# Patient Record
Sex: MALE | Race: AMERICAN INDIAN OR ALASKA NATIVE
[De-identification: names, ages, dates, MRNs, and addresses within clinical notes are randomized per-mention and may not be internally consistent; named-entity substitution may affect disease eponyms.]

---

## 2018-12-11 ENCOUNTER — HOSPITAL ENCOUNTER (EMERGENCY)
Dept: HOSPITAL 42 - ED | Age: 58
Discharge: HOME | End: 2018-12-11
Payer: COMMERCIAL

## 2018-12-11 VITALS
SYSTOLIC BLOOD PRESSURE: 129 MMHG | TEMPERATURE: 98.2 F | DIASTOLIC BLOOD PRESSURE: 88 MMHG | RESPIRATION RATE: 17 BRPM | HEART RATE: 72 BPM

## 2018-12-11 VITALS — OXYGEN SATURATION: 97 %

## 2018-12-11 VITALS — BODY MASS INDEX: 30.8 KG/M2

## 2018-12-11 DIAGNOSIS — L02.415: Primary | ICD-10-CM

## 2018-12-11 NOTE — ED PDOC
Arrival/HPI





- General


Time Seen by Provider: 12/11/18 17:14


Historian: Patient





- History of Present Illness


Narrative History of Present Illness (Text): 





12/11/18 18:06


58 year old, smoker, whose past medical includes hypertension, hyperlipedemia, 

and diverticulitis, presents to the emergency department complaining of abscess 

on the right thigh, for the past couple of weeks. Patient states that it has 

been growing the past few weeks, but was hoping that it would drain by itself. 

Patient denies fevers, chills, headache, dizziness, chest pain, shortness of 

breath, dyspnea on exertion, cough, abdominal pain, nausea, vomiting, diarrhea, 

back pain, neck pain, or any other complaint.








Time/Duration: > week


Symptom Course: Worsening


Activities at Onset: Light


Context: Home





Past Medical History





- Provider Review


Nursing Documentation Reviewed: Yes





- Cardiac


Hx Hyperlipemia: Yes


Hx Hypertension: Yes





- Gastrointestinal


Hx Diverticulitis: Yes





- Psychiatric


Hx Substance Use: No





- Past Surgical History


Past Surgical History: No Previous





- Anesthesia


Hx Anesthesia: No


Hx Anesthesia Reactions: No


Hx Malignant Hyperthermia: No





- Suicidal Assessment


Feels Threatened In Home Enviroment: No





Family/Social History





- Physician Review


Nursing Documentation Reviewed: Yes


Family/Social History: No Known Family HX


Smoking Status: Light Smoker < 10 Cigarettes Daily


Hx Alcohol Use: No


Hx Substance Use: No


Hx Substance Use Treatment: No





Allergies/Home Meds


Allergies/Adverse Reactions: 


Allergies





No Known Allergies Allergy (Verified 05/31/15 22:20)


   








Home Medications: 


                                    Home Meds











 Medication  Instructions  Recorded  Confirmed


 


Albuterol Sulfate [Albuterol Hfa] 1 puff INH PRN PRN 05/31/15 05/31/15


 


High Blood Pressure Medication  05/31/15 05/31/15














Review of Systems





- Physician Review


All systems were reviewed & negative as marked: Yes





- Review of Systems


Constitutional: absent: Fevers


Respiratory: absent: SOB, Cough


Cardiovascular: absent: Chest Pain


Gastrointestinal: absent: Abdominal Pain, Diarrhea, Nausea, Vomiting


Musculoskeletal: absent: Back Pain, Neck Pain


Skin: Abscess (on right thigh)


Neurological: absent: Headache, Dizziness





Physical Exam


Vital Signs Reviewed: Yes





Vital Signs











  Temp Pulse Resp BP Pulse Ox


 


 12/11/18 17:41  98.1 F  90  16  131/87  97











Temperature: Afebrile


Blood Pressure: Normal


Pulse: Regular


Respiratory Rate: Normal


Appearance: Positive for: Well-Appearing, Non-Toxic, Comfortable


Pain Distress: None


Mental Status: Positive for: Alert and Oriented X 3





- Systems Exam


Head: Present: Atraumatic, Normocephalic


Pupils: Present: PERRL


Extroacular Muscles: Present: EOMI


Conjunctiva: Present: Normal


Mouth: Present: Moist Mucous Membranes


Neck: Present: Normal Range of Motion


Respiratory/Chest: Present: Clear to Auscultation, Good Air Exchange.  No: 

Respiratory Distress, Accessory Muscle Use


Cardiovascular: Present: Regular Rate and Rhythm, Normal S1, S2.  No: Murmurs


Abdomen: No: Tenderness, Distention, Peritoneal Signs


Back: Present: Normal Inspection


Upper Extremity: Present: Normal Inspection.  No: Cyanosis, Edema


Lower Extremity: Present: Normal Inspection.  No: Edema


Neurological: Present: GCS=15, CN II-XII Intact, Speech Normal


Skin: Present: Warm, Dry, Normal Color, Abscess (abscess the size of a quarter 

on the lateral aspect of the right leg, entry point is apparent, no discharge. 

no surrounding cellulitis).  No: Rashes


Psychiatric: Present: Alert, Oriented x 3, Normal Insight, Normal Concentration





Medical Decision Making


ED Course and Treatment: 





12/11/18 18:07


Impression:


58 year old male who presents to the emergency department complaining of abscess

on right thigh. 





pt states he is terrified of needles and would prefer to take medications than 

to have it drained here, as i recommended for him to do.





Plan:





-- Reassess and disposition





Prior Visits:


Notes and results from previous visits were reviewed. 





Progress Notes:


12/11/18 18:50


Patient requests conservative treatment, wants to be discharged home with abx 

and is instructed to do warm skin compressions to allow for it to drain on it 

own. however pt understands that it may not drain on its own and he may need to 

return to the ED for I and D.  I have discussed the results and plan with the 

patient, who expresses understanding. Patient in agreement with plan to be 

discharged home. Patient is stable for discharge. Patient was instructed to 

follow up with physician or return if symptoms worsen or new concerning symptoms

arise.


12/12/18 11:45








- Scribe Statement


The provider has reviewed the documentation as recorded by the Robin Zaragoza





Provider Scribe Attestation:


All medical record entries made by the Scribe were at my direction and 

personally dictated by me. I have reviewed the chart and agree that the record 

accurately reflects my personal performance of the history, physical exam, 

medical decision making, and the department course for this patient. I have also

personally directed, reviewed, and agree with the discharge instructions and 

disposition.








Disposition/Present on Arrival





- Present on Arrival


Any Indicators Present on Arrival: No


History of DVT/PE: No


History of Uncontrolled Diabetes: No


Urinary Catheter: No


History Surgical Site Infection Following: None





- Disposition


Have Diagnosis and Disposition been Completed?: Yes


Diagnosis: 


 Abscess





Disposition: HOME/ ROUTINE


Disposition Time: 18:50


Patient Plan: Discharge


Condition: STABLE


Discharge Instructions (ExitCare):  Boil


Additional Instructions: 


follow up with your doctor in one week or sooner if not improved


take antibiotics and frequent warm soaks


return to the ED with any worsening or concerning symptoms


Prescriptions: 


Cephalexin [Keflex] 500 mg PO QID #28 capsule


Sulfamethoxazole/Trimethoprim [Bactrim  mg-160 mg] 1 tab PO BID #14 tab


Forms:  [employer] (English)